# Patient Record
Sex: MALE | Race: WHITE | Employment: FULL TIME | URBAN - METROPOLITAN AREA
[De-identification: names, ages, dates, MRNs, and addresses within clinical notes are randomized per-mention and may not be internally consistent; named-entity substitution may affect disease eponyms.]

---

## 2019-07-24 ENCOUNTER — APPOINTMENT (OUTPATIENT)
Dept: GENERAL RADIOLOGY | Age: 24
End: 2019-07-24
Payer: COMMERCIAL

## 2019-07-24 ENCOUNTER — HOSPITAL ENCOUNTER (EMERGENCY)
Age: 24
Discharge: HOME | End: 2019-07-24
Payer: COMMERCIAL

## 2019-07-24 VITALS
SYSTOLIC BLOOD PRESSURE: 130 MMHG | HEIGHT: 72 IN | HEART RATE: 62 BPM | DIASTOLIC BLOOD PRESSURE: 82 MMHG | WEIGHT: 250 LBS | OXYGEN SATURATION: 99 % | BODY MASS INDEX: 33.86 KG/M2 | RESPIRATION RATE: 16 BRPM | TEMPERATURE: 98.3 F

## 2019-07-24 DIAGNOSIS — S92.502D CLOSED FRACTURE OF PHALANX OF LESSER TOE OF LEFT FOOT WITH ROUTINE HEALING, PHYSEAL INVOLVEMENT UNSPECIFIED, UNSPECIFIED PHALANX, SUBSEQUENT ENCOUNTER: Primary | ICD-10-CM

## 2019-07-24 PROCEDURE — 99283 EMERGENCY DEPT VISIT LOW MDM: CPT

## 2019-07-24 PROCEDURE — 73630 X-RAY EXAM OF FOOT: CPT

## 2019-07-24 RX ORDER — IBUPROFEN 800 MG/1
800 TABLET ORAL EVERY 8 HOURS PRN
Qty: 12 TABLET | Refills: 0 | Status: SHIPPED | OUTPATIENT
Start: 2019-07-24 | End: 2021-06-23

## 2019-07-24 RX ORDER — HYDROCODONE BITARTRATE AND ACETAMINOPHEN 5; 325 MG/1; MG/1
1 TABLET ORAL EVERY 6 HOURS PRN
Qty: 12 TABLET | Refills: 0 | Status: SHIPPED | OUTPATIENT
Start: 2019-07-24 | End: 2019-07-27

## 2019-07-24 ASSESSMENT — PAIN DESCRIPTION - LOCATION: LOCATION: FOOT

## 2019-07-24 ASSESSMENT — PAIN SCALES - GENERAL: PAINLEVEL_OUTOF10: 6

## 2019-07-24 ASSESSMENT — PAIN DESCRIPTION - ORIENTATION: ORIENTATION: LEFT

## 2019-07-24 NOTE — ED PROVIDER NOTES
Oxygen Saturation Interpretation: Normal      ---------------------------------------------------PHYSICAL EXAM--------------------------------------      Constitutional/General: Alert and oriented x3, well appearing, non toxic in NAD  Head: NC/AT  Eyes: PERRL, EOMI  Mouth: Oropharynx clear, handling secretions, no trismus  Neck: Supple, full ROM, no meningeal signs  Pulmonary: Lungs clear to auscultation bilaterally, no wheezes, rales, or rhonchi. Not in respiratory distress  Cardiovascular:  Regular rate and rhythm, no murmurs, gallops, or rubs. 2+ distal pulses  Abdomen: Soft, non tender, non distended,   Extremities: Moves all extremities x 4. Warm and well perfused. Neck is ecchymosis noted to the distal aspect of the left foot along the second, third, fourth, and fifth distal metatarsals. Range of motion of the foot is intact but painful. Negative tenderness to the left ankle. Full range of motion of left ankle. Pedal pulse on the left is +2. Skin: warm and dry without rash  Neurologic: GCS 15,  Psych: Normal Affect      ------------------------------ ED COURSE/MEDICAL DECISION MAKING----------------------  Medications - No data to display      Medical Decision Making:    Was seen independently. Will place a cornelio tape splint on, postop shoe and crutches. He is to ice and elevate the extremity. 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 He cannot drive or operating machines while taking Norco.  He to follow-up with podiatry. He may need further imaging if symptoms persist.    Counseling: The emergency provider has spoken with the patient and discussed todays results, in addition to providing specific details for the plan of care and counseling regarding the diagnosis and prognosis. Questions are answered at this time and they are agreeable with the plan.      --------------------------------- IMPRESSION AND DISPOSITION ---------------------------------    IMPRESSION  1.  Closed fracture of phalanx of lesser toe of left foot with routine healing, physeal involvement unspecified, unspecified phalanx, subsequent encounter New Problem       DISPOSITION  Disposition: Discharge to home  Patient condition is stable                 Jazz Cook, [id number]  07/24/19 [id number]

## 2019-07-24 NOTE — ED NOTES
Presents to the ED with left foot pain. Hurt at work yesterday pushing  A 3000 lb cart. Foot got run over by wheel. Bruising noted to left foot.   State he had steel toe boots on      [facility]  07/24/19 [id number]

## 2021-06-23 ENCOUNTER — OFFICE VISIT (OUTPATIENT)
Dept: FAMILY MEDICINE CLINIC | Age: 26
End: 2021-06-23

## 2021-06-23 VITALS
TEMPERATURE: 97.6 F | SYSTOLIC BLOOD PRESSURE: 125 MMHG | HEART RATE: 77 BPM | OXYGEN SATURATION: 98 % | DIASTOLIC BLOOD PRESSURE: 85 MMHG | BODY MASS INDEX: 30.11 KG/M2 | WEIGHT: 222 LBS

## 2021-06-23 DIAGNOSIS — Z02.1 PHYSICAL EXAM, PRE-EMPLOYMENT: Primary | ICD-10-CM

## 2021-06-23 PROCEDURE — BUTECHPX BUTECH CORPORATE ACCOUNT PHYSICAL: Performed by: PHYSICIAN ASSISTANT

## 2021-06-23 ASSESSMENT — ENCOUNTER SYMPTOMS
VOMITING: 0
DIARRHEA: 0
PHOTOPHOBIA: 0
COUGH: 0
BACK PAIN: 0
ABDOMINAL PAIN: 0
NAUSEA: 0
SORE THROAT: 0
SHORTNESS OF BREATH: 0

## 2021-06-23 NOTE — PROGRESS NOTES
21  Ishmael Dominguez : 1995 Sex: male  Age 23 y.o. Subjective:  Chief Complaint   Patient presents with    Employment Physical         Patient is a 81-year-old male with negative past medical history who does not currently take any medications presenting for preemployment physical.  He will begin a full-time position at [employer] as an . His employment is also pending the results of a drug screen. Patient has no current complaints. Patient has no history of chest pain, shortness of breath or unexplained syncope. He denies any lightheadedness, dizziness, headaches or visual disturbances. No lateralizing weakness. No numbness or tingling to his upper or lower extremities. He denies any back pain. Patient denies any fever, chills, nausea or vomiting. Vision: uncorrected   Hearing: No HearingNo   Restrictions? no    Any restrictions on the ability to stand between 8-10 hours per day? No  Able to walk without issues at least 25% of workday? Yes  Any restrictions on the ablility to bend or twist? No  Able to lift up to 50 pounds? Yes  Any restrictions with the ability to use their hands which could inhibit the manipulation of hand  tools? (For example, but not limited to screw drivers-rotation type motion) no  Any restrictions or difficulties with heights? No  (Ladders/platforms are used on some jobs up to 48 foot above ground)           Review of Systems   Constitutional: Negative for chills and fever. HENT: Negative for congestion, ear pain and sore throat. Eyes: Negative for photophobia and visual disturbance. Respiratory: Negative for cough and shortness of breath. Cardiovascular: Negative for chest pain. Gastrointestinal: Negative for abdominal pain, diarrhea, nausea and vomiting. Genitourinary: Negative for difficulty urinating, dysuria, frequency and urgency. Musculoskeletal: Negative for back pain, neck pain and neck stiffness.    Skin: Negative for